# Patient Record
Sex: MALE | Race: WHITE | Employment: FULL TIME | URBAN - METROPOLITAN AREA
[De-identification: names, ages, dates, MRNs, and addresses within clinical notes are randomized per-mention and may not be internally consistent; named-entity substitution may affect disease eponyms.]

---

## 2019-08-02 PROBLEM — G35 MULTIPLE SCLEROSIS (HCC): Chronic | Status: ACTIVE | Noted: 2019-08-02

## 2019-08-02 PROBLEM — Z72.0 TOBACCO ABUSE: Chronic | Status: ACTIVE | Noted: 2019-08-02

## 2019-08-02 PROBLEM — R51.9 HEADACHE: Status: ACTIVE | Noted: 2019-08-02

## 2019-08-02 PROBLEM — R55 SYNCOPE: Status: ACTIVE | Noted: 2019-08-02

## 2019-10-15 PROBLEM — M25.50 PAIN IN JOINT: Status: ACTIVE | Noted: 2019-10-15

## 2022-03-18 PROBLEM — G35 MULTIPLE SCLEROSIS (HCC): Status: ACTIVE | Noted: 2019-08-02

## 2022-03-19 PROBLEM — E66.01 MORBID OBESITY: Status: ACTIVE | Noted: 2019-08-02

## 2022-03-19 PROBLEM — Z72.0 TOBACCO ABUSE: Status: ACTIVE | Noted: 2019-08-02

## 2022-03-19 PROBLEM — R51.9 HEADACHE: Status: ACTIVE | Noted: 2019-08-02

## 2022-03-19 PROBLEM — M25.50 PAIN IN JOINT: Status: ACTIVE | Noted: 2019-10-15

## 2022-03-20 PROBLEM — R55 SYNCOPE: Status: ACTIVE | Noted: 2019-08-02

## 2022-12-18 ENCOUNTER — HOSPITAL ENCOUNTER (EMERGENCY)
Age: 37
Discharge: HOME | End: 2022-12-18
Attending: FAMILY MEDICINE
Payer: COMMERCIAL

## 2022-12-18 ENCOUNTER — APPOINTMENT (OUTPATIENT)
Dept: GENERAL RADIOLOGY | Age: 37
End: 2022-12-18
Attending: FAMILY MEDICINE
Payer: COMMERCIAL

## 2022-12-18 VITALS
BODY MASS INDEX: 44.1 KG/M2 | DIASTOLIC BLOOD PRESSURE: 116 MMHG | HEART RATE: 85 BPM | SYSTOLIC BLOOD PRESSURE: 166 MMHG | OXYGEN SATURATION: 97 % | TEMPERATURE: 97.7 F | HEIGHT: 71 IN | WEIGHT: 315 LBS | RESPIRATION RATE: 20 BRPM

## 2022-12-18 DIAGNOSIS — S61.012A LACERATION OF LEFT THUMB WITHOUT FOREIGN BODY WITHOUT DAMAGE TO NAIL, INITIAL ENCOUNTER: ICD-10-CM

## 2022-12-18 DIAGNOSIS — S69.92XA INJURY TO FINGERNAIL OF LEFT HAND, INITIAL ENCOUNTER: Primary | ICD-10-CM

## 2022-12-18 PROCEDURE — 90714 TD VACC NO PRESV 7 YRS+ IM: CPT | Performed by: FAMILY MEDICINE

## 2022-12-18 PROCEDURE — 99284 EMERGENCY DEPT VISIT MOD MDM: CPT

## 2022-12-18 PROCEDURE — 74011000250 HC RX REV CODE- 250: Performed by: FAMILY MEDICINE

## 2022-12-18 PROCEDURE — 75810000293 HC SIMP/SUPERF WND  RPR

## 2022-12-18 PROCEDURE — 90471 IMMUNIZATION ADMIN: CPT

## 2022-12-18 PROCEDURE — 74011250636 HC RX REV CODE- 250/636: Performed by: FAMILY MEDICINE

## 2022-12-18 PROCEDURE — 73130 X-RAY EXAM OF HAND: CPT

## 2022-12-18 RX ORDER — LIDOCAINE HYDROCHLORIDE 20 MG/ML
0.3 INJECTION, SOLUTION INFILTRATION; PERINEURAL ONCE
Status: COMPLETED | OUTPATIENT
Start: 2022-12-18 | End: 2022-12-18

## 2022-12-18 RX ORDER — CEPHALEXIN 500 MG/1
500 CAPSULE ORAL 4 TIMES DAILY
Qty: 28 CAPSULE | Refills: 0 | Status: SHIPPED | OUTPATIENT
Start: 2022-12-18 | End: 2022-12-25

## 2022-12-18 RX ADMIN — LIDOCAINE HYDROCHLORIDE 6 MG: 20 INJECTION, SOLUTION INFILTRATION; PERINEURAL at 09:26

## 2022-12-18 RX ADMIN — TETANUS AND DIPHTHERIA TOXOIDS ADSORBED 0.5 ML: 2; 2 INJECTION INTRAMUSCULAR at 09:54

## 2022-12-18 RX ADMIN — BACITRACIN, NEOMYCIN, POLYMYXIN B 1 PACKET: 400; 3.5; 5 OINTMENT TOPICAL at 10:00

## 2022-12-18 NOTE — Clinical Note
[address]  [address] [place] [id number]  [phone]    Work/School Note    Date: 12/18/2022    To Whom It May concern:    Devan Martinez was seen and treated today in the emergency room by the following provider(s):  Attending Provider: Momo Wolf DO. Devan Martinez is excused from work/school on 12/18/22 and 12/19/22. He is medically clear to return to work/school on 12/20/2022.        Sincerely,          Suma Darby DO

## 2022-12-18 NOTE — DISCHARGE INSTRUCTIONS
Thank you! Thank you for allowing me to care for you in the emergency department. It is my goal to provide you with excellent care. If you have not received excellent quality care, please ask to speak to the nurse manager. Please fill out the survey that will come to you by mail or email since we listen to your feedback! Below you will find a list of your tests from today's visit. Should you have any questions, please do not hesitate to call the emergency department. Labs  No results found for this or any previous visit (from the past 12 hour(s)). Radiologic Studies  XR HAND LT MIN 3 V   Final Result   No acute fracture. Soft tissue injury distal third finger. No radiopaque foreign   body. CT Results  (Last 48 hours)      None          CXR Results  (Last 48 hours)      None          ------------------------------------------------------------------------------------------------------------  The exam and treatment you received in the Emergency Department were for an urgent problem and are not intended as complete care. It is important that you follow-up with a doctor, nurse practitioner, or physician assistant to:  (1) confirm your diagnosis,  (2) re-evaluation of changes in your illness and treatment, and  (3) for ongoing care. Please take your discharge instructions with you when you go to your follow-up appointment. If you have any problem arranging a follow-up appointment, contact the Emergency Department. If your symptoms become worse or you do not improve as expected and you are unable to reach your health care provider, please return to the Emergency Department. We are available 24 hours a day. If a prescription has been provided, please have it filled as soon as possible to prevent a delay in treatment.  If you have any questions or reservations about taking the medication due to side effects or interactions with other medications, please call your primary care provider or contact the ER.

## 2022-12-18 NOTE — ED PROVIDER NOTES
EMERGENCY DEPARTMENT HISTORY AND PHYSICAL EXAM      Date: 2022  Patient Name: Nichole Montero    History of Presenting Illness     Chief Complaint   Patient presents with    Laceration       History Provided By:     HPI: Nichole Montero, is a very pleasant 40 y.o. male presenting to the ED with a chief complaint of laceration. Patient states prior to arrival he cut his left thumb with a saw while cutting wood. Laceration to the distal aspect of left thumb. He also cut part of the left index finger nail. No numbness tingling or weakness in this extremity. No identifiable foreign body. No other injuries. The patient denies any other symptoms at this time. PCP: Avery BAUM PA-C    No current facility-administered medications on file prior to encounter. Current Outpatient Medications on File Prior to Encounter   Medication Sig Dispense Refill    sertraline (ZOLOFT) 100 mg tablet Take 1 Tab by mouth daily. 90 Tab 1    TECFIDERA 120 mg (14)- 240 mg (46) cpDR       sertraline (ZOLOFT) 50 mg tablet Take one tablet daily for 7 days then increase to two tablets daily 45 Tab 1       Past History     Past Medical History:  Past Medical History:   Diagnosis Date    Morbid obesity ([facility] [address] 2019    Multiple sclerosis (HCC)     Tumefactive MS, followed by Dr. Andrea Sánchez at [address] [place] [address] 2019       Past Surgical History:  Past Surgical History:   Procedure Laterality Date    NEUROLOGICAL PROCEDURE UNLISTED      Exp. brain surgery       Family History:  Family History   Problem Relation Age of Onset    Diabetes Other     Other Neg Hx         multiple sclerosis       Social History:  Social History     Tobacco Use    Smoking status: Former     Packs/day: 1.00     Types: Cigarettes     Quit date: 10/1/2014     Years since quittin.2    Smokeless tobacco: Never   Substance Use Topics    Alcohol use: Yes     Comment: social-beer    Drug use: Never       Allergies:   Allergies   Allergen Reactions    Penicillins Hives and Nausea and Vomiting         Review of Systems     Review of Systems   Constitutional:  Negative for activity change, appetite change, chills, fatigue and fever. HENT:  Negative for congestion and sore throat. Eyes:  Negative for photophobia and visual disturbance. Respiratory:  Negative for cough, shortness of breath and wheezing. Cardiovascular:  Negative for chest pain, palpitations and leg swelling. Gastrointestinal:  Negative for abdominal pain, diarrhea, nausea and vomiting. Endocrine: Negative for cold intolerance and heat intolerance. Musculoskeletal:  Negative for gait problem and joint swelling. Skin:  Positive for wound. Negative for color change and rash. Neurological:  Negative for dizziness and headaches. Physical Exam     Physical Exam  Physical Exam  Constitutional:       General: He is not in acute distress. Appearance: Normal appearance. He is not toxic-appearing. HENT:      Head: Normocephalic and atraumatic. Right Ear: External ear normal.      Left Ear: External ear normal.      Mouth/Throat:      Mouth: Mucous membranes are moist.      Pharynx: Oropharynx is clear. Eyes:      Extraocular Movements: Extraocular movements intact. Conjunctiva/sclera: Conjunctivae normal.   Cardiovascular:      Rate and Rhythm: Normal rate and regular rhythm. Pulses: Normal pulses. Heart sounds: Normal heart sounds. Pulmonary:      Effort: Pulmonary effort is normal. No respiratory distress. Breath sounds: Normal breath sounds. No wheezing, rhonchi or rales. Abdominal:      General: There is no distension. Palpations: Abdomen is soft. Tenderness: There is no abdominal tenderness. There is no guarding or rebound. Musculoskeletal:         General: No deformity. Cervical back: Normal range of motion and neck supple. Right lower leg: No edema. Left lower leg: No edema.       Comments: Muscle strength testing in flexion and extension 5/5 at all joints of left thumb and index finger laceration left index finger nail. No identifiable involvement of nailbed  Skin:     Capillary Refill: Capillary refill takes less than 2 seconds. Findings: No erythema or rash. Comments: 2 cm curvilinear laceration distal aspect left thumb on palmar side. Neurological:      General: No focal deficit present. Mental Status: He is alert and oriented to person, place, and time. Gait: Gait normal.      Comments: Patient neurovascularly intact left hand and fingers   Psychiatric:         Mood and Affect: Mood normal.         Behavior: Behavior normal.     Lab and Diagnostic Study Results     Labs -   No results found for this or any previous visit (from the past 12 hour(s)). Radiologic Studies -   @lastxrresult@  CT Results  (Last 48 hours)      None          CXR Results  (Last 48 hours)      None              Medical Decision Making   - I am the first provider for this patient. - I reviewed the vital signs, available nursing notes, past medical history, past surgical history, family history and social history. - Initial assessment performed. The patients presenting problems have been discussed, and they are in agreement with the care plan formulated and outlined with them. I have encouraged them to ask questions as they arise throughout their visit. Vital Signs-Reviewed the patient's vital signs. Patient Vitals for the past 12 hrs:   Temp Pulse Resp BP SpO2   12/18/22 0912 97.7 °F (36.5 °C) 85 20 (!) 166/116 97 %         ED Course/ Provider Notes (Medical Decision Making):     Patient presented to the emergency department with the aforementioned chief complaint. On examination the patient is nontoxic. Vitals were reviewed per above. X-ray reviewed and negative for fracture/foreign body. Laceration closed per procedure note.   Laceration index fingernail with no identifiable involvement of nailbed, thus nail was left intact. Will prescribe Keflex. Daniela Marie was given a thorough list of signs and symptoms that would warrant an immediate return to the emergency department. Otherwise Daniela Marie will follow up with PCP. Procedures   Medical Decision Makingedical Decision Making  Performed by: Arelis Macias DO  Procedures    Wound Closure by Adhesive    Date/Time: 12/18/2022 11:44 AM  Performed by: Bk Son DO  Authorized by: Bk Son DO     Consent:     Consent obtained:  Verbal    Consent given by:  Patient    Risks discussed:  Infection, retained foreign body, tendon damage, vascular damage, poor wound healing, poor cosmetic result and nerve damage    Alternatives discussed:  No treatment, referral and delayed treatment  Universal protocol:     Patient identity confirmed:  Verbally with patient and arm band  Anesthesia:     Anesthesia method:  None  Laceration details:     Location: Left thumb.     Length (cm):  2  Pre-procedure details:     Preparation:  Imaging obtained to evaluate for foreign bodies  Exploration:     Imaging obtained: x-ray      Imaging outcome: foreign body not noted      Wound exploration: wound explored through full range of motion and entire depth of wound visualized      Wound extent: no tendon damage noted and no underlying fracture noted      Contaminated: no    Treatment:     Area cleansed with:  Saline    Amount of cleaning:  Extensive    Irrigation solution:  Sterile saline    Debridement:  None  Skin repair:     Repair method:  Sutures    Suture size:  3-0    Suture material:  Nylon    Suture technique:  Simple interrupted    Number of sutures:  5  Approximation:     Approximation:  Close  Repair type:     Repair type:  Simple  Post-procedure details:     Dressing:  Non-adherent dressing    Procedure completion:  Tolerated well, no immediate complications    Disposition   Disposition:     Home     All of the diagnostic tests were reviewed and questions answered. Diagnosis, care plan and treatment options were discussed. The patient understands the instructions and will follow up as directed. The patients results have been reviewed with them. They have been counseled regarding their diagnosis. The patient verbally convey understanding and agreement of the signs, symptoms, diagnosis, treatment and prognosis and additionally agrees to follow up as recommended with their PCP in 24 - 48 hours. They also agree with the care-plan and convey that all of their questions have been answered. I have also put together some discharge instructions for them that include: 1) educational information regarding their diagnosis, 2) how to care for their diagnosis at home, as well a 3) list of reasons why they would want to return to the ED prior to their follow-up appointment, should their condition change. DISCHARGE PLAN:    1. Cannot display discharge medications since this patient is not currently admitted. 2.   Follow-up Information       Follow up With Specialties Details Why Contact Info    Your primary care doctor  Schedule an appointment as soon as possible for a visit in 2 days                3.  Return to ED if worse       4. Discharge Medication List as of 12/18/2022 10:35 AM        START taking these medications    Details   cephALEXin (Keflex) 500 mg capsule Take 1 Capsule by mouth four (4) times daily for 7 days. , Normal, Disp-28 Capsule, R-0           CONTINUE these medications which have NOT CHANGED    Details   !! sertraline (ZOLOFT) 100 mg tablet Take 1 Tab by mouth daily. , Normal, Disp-90 Tab, R-1      TECFIDERA 120 mg (14)- 240 mg (46) cpDR Historical Med, BHARAT      !! sertraline (ZOLOFT) 50 mg tablet Take one tablet daily for 7 days then increase to two tablets daily, Normal, Disp-45 Tab, R-1       !! - Potential duplicate medications found. Please discuss with provider.             Diagnosis     Clinical Impression: 1. Injury to fingernail of left hand, initial encounter    2. Laceration of left thumb without foreign body without damage to nail, initial encounter        Attestations:    Cameron Show, DO    Please note that this dictation was completed with [employer], the computer voice recognition software. Quite often unanticipated grammatical, syntax, homophones, and other interpretive errors are inadvertently transcribed by the computer software. Please disregard these errors. Please excuse any errors that have escaped final proofreading. Thank you.

## 2022-12-18 NOTE — ED TRIAGE NOTES
PT. TO ED WITH C/O LACERATIONS TO LEFT INDEX AND MIDDLE FINGER ON A SAW, PTA TO ED.  BLEEDING CONTROLLED.

## 2023-01-01 ENCOUNTER — HOSPITAL ENCOUNTER (EMERGENCY)
Age: 38
Discharge: HOME | End: 2023-01-01
Attending: FAMILY MEDICINE
Payer: COMMERCIAL

## 2023-01-01 VITALS
WEIGHT: 315 LBS | BODY MASS INDEX: 42.66 KG/M2 | RESPIRATION RATE: 17 BRPM | SYSTOLIC BLOOD PRESSURE: 141 MMHG | HEART RATE: 66 BPM | TEMPERATURE: 98.4 F | OXYGEN SATURATION: 100 % | HEIGHT: 72 IN | DIASTOLIC BLOOD PRESSURE: 92 MMHG

## 2023-01-01 DIAGNOSIS — Z48.02 VISIT FOR SUTURE REMOVAL: Primary | ICD-10-CM

## 2023-01-01 PROCEDURE — 99283 EMERGENCY DEPT VISIT LOW MDM: CPT

## 2023-01-01 RX ORDER — CLINDAMYCIN HYDROCHLORIDE 300 MG/1
300 CAPSULE ORAL 4 TIMES DAILY
Qty: 28 CAPSULE | Refills: 0 | Status: SHIPPED | OUTPATIENT
Start: 2023-01-01 | End: 2023-01-08

## 2023-01-01 NOTE — ED PROVIDER NOTES
EMERGENCY DEPARTMENT HISTORY AND PHYSICAL EXAM      Date: 2023  Patient Name: Lanie Garcia    History of Presenting Illness     Chief Complaint   Patient presents with    Suture Removal       History Provided By: Patient    HPI: Lanie Garcia, 40 y.o. male presented to the ED for suture removal. Sutures were placed 10 days ago and are located in the left ring finger. There are no other complaints, changes, or physical findings at this time. Past History     Past Medical History:  Past Medical History:   Diagnosis Date    Morbid obesity ([address] 2019    Multiple sclerosis (HCC)     Tumefactive MS, followed by Dr. Katie Chris at [address] [place] [address] 2019       Past Surgical History:  Past Surgical History:   Procedure Laterality Date    NEUROLOGICAL PROCEDURE UNLISTED      Exp. brain surgery       Family History:  Family History   Problem Relation Age of Onset    Diabetes Other     Other Neg Hx         multiple sclerosis       Social History:  Social History     Tobacco Use    Smoking status: Former     Packs/day: 1.00     Types: Cigarettes     Quit date: 10/1/2014     Years since quittin.2    Smokeless tobacco: Never   Substance Use Topics    Alcohol use: Yes     Comment: social-beer    Drug use: Never       Allergies: Allergies   Allergen Reactions    Penicillins Hives and Nausea and Vomiting       PCP: Jillian Wei PA-C    No current facility-administered medications on file prior to encounter. Current Outpatient Medications on File Prior to Encounter   Medication Sig Dispense Refill    sertraline (ZOLOFT) 100 mg tablet Take 1 Tab by mouth daily. 90 Tab 1    TECFIDERA 120 mg (14)- 240 mg (46) cpDR       sertraline (ZOLOFT) 50 mg tablet Take one tablet daily for 7 days then increase to two tablets daily 45 Tab 1       Review of Systems   Review of Systems  Review of Systems:  Skin: sutured wound.  Here for removal.  Physical Exam   Physical Exam  Physical Exam:  JENNIFFER ZAMUDIO patient in NAD. Skin: Sutures noted. No dehiscence. Surrounding erythema: Yes  Purulent drainage: No  Lymphangitis: None  Lab and Diagnostic Study Results   Labs -   No results found for this or any previous visit (from the past 12 hour(s)). Radiologic Studies -   @lastxrresult@  CT Results  (Last 48 hours)      None          CXR Results  (Last 48 hours)      None            Medical Decision Making and ED Course   Differential Diagnosis & Medical Decision Making Provider Note:   Patient presents for suture removal from the left forefinger. Scabbing with mild redness noted, no purulent or discharge noted. Area was cleaned and bandage was applied. Will add Clindamycin 300 mg QID  for 7 days. Procedure:  5 sutures were removed from left forefinger. There was no dehisence. There was no purulence coming from the suture site. There was mild erythema noted on exam.    - I am the first provider for this patient. I reviewed the vital signs, available nursing notes, past medical history, past surgical history, family history and social history. The patients presenting problems have been discussed, and they are in agreement with the care plan formulated and outlined with them. I have encouraged them to ask questions as they arise throughout their visit. Vital Signs-Reviewed the patient's vital signs. Patient Vitals for the past 12 hrs:   Temp Pulse Resp BP SpO2   01/01/23 1709 98.4 °F (36.9 °C) 66 17 (!) 141/92 100 %       ED Course:          Procedures   Performed by: Corry Jaramillo NP  Procedures      Disposition   Disposition: DC- Adult Discharges: All of the diagnostic tests were reviewed and questions answered. Diagnosis, care plan and treatment options were discussed. The patient understands the instructions and will follow up as directed. The patients results have been reviewed with them. They have been counseled regarding their diagnosis.   The patient verbally convey understanding and agreement of the signs, symptoms, diagnosis, treatment and prognosis and additionally agrees to follow up as recommended with their PCP in 24 - 48 hours. They also agree with the care-plan and convey that all of their questions have been answered. I have also put together some discharge instructions for them that include: 1) educational information regarding their diagnosis, 2) how to care for their diagnosis at home, as well a 3) list of reasons why they would want to return to the ED prior to their follow-up appointment, should their condition change. DISCHARGE PLAN:  1. Current Discharge Medication List        START taking these medications    Details   clindamycin (CLEOCIN) 300 mg capsule Take 1 Capsule by mouth four (4) times daily for 7 days. Qty: 28 Capsule, Refills: 0           CONTINUE these medications which have NOT CHANGED    Details   !! sertraline (ZOLOFT) 100 mg tablet Take 1 Tab by mouth daily. Qty: 90 Tab, Refills: 1      TECFIDERA 120 mg (14)- 240 mg (46) cpDR       ! ! sertraline (ZOLOFT) 50 mg tablet Take one tablet daily for 7 days then increase to two tablets daily  Qty: 45 Tab, Refills: 1    Associated Diagnoses: Depression, unspecified depression type       !! - Potential duplicate medications found. Please discuss with provider. 2.   Follow-up Information       Follow up With Specialties Details Why Contact Info    Joey Bender PA-C Physician Assistant  If symptoms worsen [id number] [phone]            3. Return to ED if worse   4. Discharge Medication List as of 1/1/2023  5:52 PM        START taking these medications    Details   clindamycin (CLEOCIN) 300 mg capsule Take 1 Capsule by mouth four (4) times daily for 7 days. , Normal, Disp-28 Capsule, R-0           CONTINUE these medications which have NOT CHANGED    Details   !! sertraline (ZOLOFT) 100 mg tablet Take 1 Tab by mouth daily. , Normal, Disp-90 Tab, R-1      TECFIDERA 120 mg (14)- 240 mg (46) cpDR Historical Med, BHARAT      !! sertraline (ZOLOFT) 50 mg tablet Take one tablet daily for 7 days then increase to two tablets daily, Normal, Disp-45 Tab, R-1       !! - Potential duplicate medications found. Please discuss with provider. Diagnosis/Clinical Impression     Clinical Impression:   1. Visit for suture removal        Attestations: Wagner BARKSDALE NP, am the primary clinician of record. Please note that this dictation was completed with [employer], the [employer] voice recognition software. Quite often unanticipated grammatical, syntax, homophones, and other interpretive errors are inadvertently transcribed by the computer software. Please disregard these errors. Please excuse any errors that have escaped final proofreading. Thank you.

## 2025-02-23 ENCOUNTER — HOSPITAL ENCOUNTER (EMERGENCY)
Facility: HOSPITAL | Age: 40
Discharge: HOME | End: 2025-02-24
Payer: COMMERCIAL

## 2025-02-23 DIAGNOSIS — J10.1 INFLUENZA B: Primary | ICD-10-CM

## 2025-02-23 PROCEDURE — 99284 EMERGENCY DEPT VISIT MOD MDM: CPT

## 2025-02-23 PROCEDURE — 87636 SARSCOV2 & INF A&B AMP PRB: CPT

## 2025-02-23 ASSESSMENT — PAIN - FUNCTIONAL ASSESSMENT: PAIN_FUNCTIONAL_ASSESSMENT: NONE - DENIES PAIN

## 2025-02-23 ASSESSMENT — LIFESTYLE VARIABLES
HOW MANY STANDARD DRINKS CONTAINING ALCOHOL DO YOU HAVE ON A TYPICAL DAY: PATIENT DOES NOT DRINK
HOW OFTEN DO YOU HAVE A DRINK CONTAINING ALCOHOL: NEVER

## 2025-02-24 ENCOUNTER — APPOINTMENT (OUTPATIENT)
Facility: HOSPITAL | Age: 40
End: 2025-02-24
Payer: COMMERCIAL

## 2025-02-24 VITALS
HEART RATE: 109 BPM | OXYGEN SATURATION: 96 % | SYSTOLIC BLOOD PRESSURE: 155 MMHG | BODY MASS INDEX: 42.66 KG/M2 | DIASTOLIC BLOOD PRESSURE: 90 MMHG | TEMPERATURE: 98.6 F | HEIGHT: 72 IN | WEIGHT: 315 LBS | RESPIRATION RATE: 16 BRPM

## 2025-02-24 LAB
FLUAV RNA SPEC QL NAA+PROBE: NOT DETECTED
FLUBV RNA SPEC QL NAA+PROBE: DETECTED
SARS-COV-2 RNA RESP QL NAA+PROBE: NOT DETECTED

## 2025-02-24 PROCEDURE — 71045 X-RAY EXAM CHEST 1 VIEW: CPT

## 2025-02-24 NOTE — DISCHARGE INSTRUCTIONS
Thank you for choosing our Emergency Department for your care.  It is our privilege to care for you in your time of need.  In the next several days, you may receive a survey via email or mailed to your home about your experience with our team.  We would greatly appreciate you taking a few minutes to complete the survey, as we use this information to learn what we have done well and what we could be doing better. Thank you for trusting us with your care!    Below you will find a list of your tests from today's visit.   Labs and Radiology Studies  Recent Results (from the past 12 hour(s))   COVID-19 & Influenza Combo    Collection Time: 02/23/25 11:57 PM    Specimen: Nasopharyngeal   Result Value Ref Range    SARS-CoV-2, PCR Not Detected Not Detected      Rapid Influenza A By PCR Not Detected Not Detected      Rapid Influenza B By PCR DETECTED (A) Not Detected       XR CHEST PORTABLE    Result Date: 2/24/2025  EXAM:  XR CHEST PORTABLE INDICATION: cough COMPARISON: none TECHNIQUE: Single portable chest AP view FINDINGS: The cardiac silhouette is within normal limits. The pulmonary vasculature is within normal limits. The lungs and pleural spaces are clear. The visualized bones and upper abdomen are age-appropriate.     No acute process on portable chest. Electronically signed by Mac Bryant    ------------------------------------------------------------------------------------------------------------  The evaluation and treatment you received in the Emergency Department were for an urgent problem. It is important that you follow-up with a doctor, nurse practitioner, or physician assistant to:  (1) confirm your diagnosis,  (2) re-evaluation of changes in your illness and treatment, and (3) for ongoing care. Please take your discharge instructions with you when you go to your follow-up appointment.     If you have any problem arranging a follow-up appointment, contact us!  If your symptoms become worse or you do

## 2025-02-24 NOTE — ED PROVIDER NOTES
[facility] EMERGENCY DEPT  EMERGENCY DEPARTMENT HISTORY AND PHYSICAL EXAM      Date: 2/23/2025  Patient Name: Joaquin Lomeli  MRN: [id number]  YOB: 1985  Date of evaluation: 2/23/2025  Provider: Elma Rios MD   Note Started: 3:22 AM EST 2/24/25    HISTORY OF PRESENT ILLNESS   No chief complaint on file.      History Provided By: Patient    HPI: Joaquin Lomeli is a 39 y.o. male who presents with cough, congestion body aches x 2 days. Denies any fevers, vomiting.     PAST MEDICAL HISTORY   Past Medical History:  Past Medical History:   Diagnosis Date    Morbid obesity 8/2/2019    Multiple sclerosis (HCC)     Tumefactive MS, followed by Dr. Courtney at [facility]    Tobacco abuse 8/2/2019       Past Surgical History:  Past Surgical History:   Procedure Laterality Date    NEUROLOGICAL SURGERY      Exp. brain surgery       Family History:  Family History   Problem Relation Age of Onset    Other Neg Hx         multiple sclerosis    Diabetes Other        Social History:  Social History     Tobacco Use    Smoking status: Every Day     Current packs/day: 0.00     Types: Cigarettes     Last attempt to quit: 10/1/2014     Years since quitting: 10.4    Smokeless tobacco: Never   Vaping Use    Vaping status: Never Used   Substance Use Topics    Alcohol use: Yes    Drug use: Never       Allergies:  Allergies   Allergen Reactions    Penicillins Hives and Nausea And Vomiting       PCP: Venice Valenzuela PA-C    Current Meds:   No current facility-administered medications for this encounter.     Current Outpatient Medications   Medication Sig Dispense Refill    dimethyl Fumarate (TECFIDERA) 120 & 240 MG ceived the following from [facility]: Outside name: TECFIDERA 120 mg (14)- 240 mg (46) cpDR      sertraline (ZOLOFT) 100 MG tablet Take 100 mg by mouth daily      sertraline (ZOLOFT) 50 MG tablet Take one tablet daily for 7 days then increase to two tablets daily         Social Determinants of